# Patient Record
Sex: FEMALE | Race: OTHER
[De-identification: names, ages, dates, MRNs, and addresses within clinical notes are randomized per-mention and may not be internally consistent; named-entity substitution may affect disease eponyms.]

---

## 2024-11-04 ENCOUNTER — HOSPITAL ENCOUNTER (OUTPATIENT)
Facility: HOSPITAL | Age: 24
Setting detail: SPECIMEN
Discharge: HOME | End: 2024-11-07

## 2024-11-04 ENCOUNTER — OFFICE VISIT (OUTPATIENT)
Age: 24
End: 2024-11-04

## 2024-11-04 VITALS
BODY MASS INDEX: 30.36 KG/M2 | OXYGEN SATURATION: 98 % | SYSTOLIC BLOOD PRESSURE: 118 MMHG | HEIGHT: 62 IN | WEIGHT: 165 LBS | HEART RATE: 58 BPM | DIASTOLIC BLOOD PRESSURE: 75 MMHG | TEMPERATURE: 98 F

## 2024-11-04 DIAGNOSIS — N92.6 IRREGULAR MENSTRUATION: ICD-10-CM

## 2024-11-04 DIAGNOSIS — R55 PRE-SYNCOPE: ICD-10-CM

## 2024-11-04 DIAGNOSIS — Z87.42 HISTORY OF OVARIAN CYST: ICD-10-CM

## 2024-11-04 DIAGNOSIS — G58.8 INTERCOSTAL NEURITIS: ICD-10-CM

## 2024-11-04 DIAGNOSIS — R55 PRE-SYNCOPE: Primary | ICD-10-CM

## 2024-11-04 LAB
ALBUMIN SERPL-MCNC: 4.3 G/DL (ref 3.5–5)
ALBUMIN/GLOB SERPL: 1.2 (ref 1.1–2.2)
ALP SERPL-CCNC: 116 U/L (ref 45–117)
ALT SERPL-CCNC: 32 U/L (ref 12–78)
ANION GAP SERPL CALC-SCNC: 7 MMOL/L (ref 2–12)
AST SERPL-CCNC: 16 U/L (ref 15–37)
BASOPHILS # BLD: 0.1 K/UL (ref 0–0.1)
BASOPHILS NFR BLD: 1 % (ref 0–1)
BILIRUB SERPL-MCNC: 0.5 MG/DL (ref 0.2–1)
BUN SERPL-MCNC: 11 MG/DL (ref 6–20)
BUN/CREAT SERPL: 21 (ref 12–20)
CALCIUM SERPL-MCNC: 8.7 MG/DL (ref 8.5–10.1)
CHLORIDE SERPL-SCNC: 108 MMOL/L (ref 97–108)
CHOLEST SERPL-MCNC: 184 MG/DL
CO2 SERPL-SCNC: 25 MMOL/L (ref 21–32)
COMMENT:: NORMAL
CREAT SERPL-MCNC: 0.52 MG/DL (ref 0.55–1.02)
DIFFERENTIAL METHOD BLD: NORMAL
EOSINOPHIL # BLD: 0.1 K/UL (ref 0–0.4)
EOSINOPHIL NFR BLD: 1 % (ref 0–7)
ERYTHROCYTE [DISTWIDTH] IN BLOOD BY AUTOMATED COUNT: 12.1 % (ref 11.5–14.5)
EST. AVERAGE GLUCOSE BLD GHB EST-MCNC: 85 MG/DL
FERRITIN SERPL-MCNC: 128 NG/ML (ref 8–252)
GLOBULIN SER CALC-MCNC: 3.5 G/DL (ref 2–4)
GLUCOSE SERPL-MCNC: 85 MG/DL (ref 65–100)
GLUCOSE, POC: NORMAL MG/DL
HBA1C MFR BLD: 4.6 % (ref 4–5.6)
HCT VFR BLD AUTO: 37.9 % (ref 35–47)
HDLC SERPL-MCNC: 45 MG/DL
HDLC SERPL: 4.1 (ref 0–5)
HGB BLD-MCNC: 12.4 G/DL (ref 11.5–16)
IMM GRANULOCYTES # BLD AUTO: 0 K/UL (ref 0–0.04)
IMM GRANULOCYTES NFR BLD AUTO: 0 % (ref 0–0.5)
LDLC SERPL CALC-MCNC: 121.8 MG/DL (ref 0–100)
LYMPHOCYTES # BLD: 3.3 K/UL (ref 0.8–3.5)
LYMPHOCYTES NFR BLD: 38 % (ref 12–49)
MCH RBC QN AUTO: 30.2 PG (ref 26–34)
MCHC RBC AUTO-ENTMCNC: 32.7 G/DL (ref 30–36.5)
MCV RBC AUTO: 92.2 FL (ref 80–99)
MONOCYTES # BLD: 0.5 K/UL (ref 0–1)
MONOCYTES NFR BLD: 6 % (ref 5–13)
NEUTS SEG # BLD: 4.7 K/UL (ref 1.8–8)
NEUTS SEG NFR BLD: 54 % (ref 32–75)
NRBC # BLD: 0 K/UL (ref 0–0.01)
NRBC BLD-RTO: 0 PER 100 WBC
PLATELET # BLD AUTO: 395 K/UL (ref 150–400)
PMV BLD AUTO: 9.8 FL (ref 8.9–12.9)
POTASSIUM SERPL-SCNC: 4 MMOL/L (ref 3.5–5.1)
PROT SERPL-MCNC: 7.8 G/DL (ref 6.4–8.2)
RBC # BLD AUTO: 4.11 M/UL (ref 3.8–5.2)
SODIUM SERPL-SCNC: 140 MMOL/L (ref 136–145)
SPECIMEN HOLD: NORMAL
TRIGL SERPL-MCNC: 86 MG/DL
TSH SERPL DL<=0.05 MIU/L-ACNC: 1.16 UIU/ML (ref 0.36–3.74)
VIT B12 SERPL-MCNC: 481 PG/ML (ref 193–986)
VLDLC SERPL CALC-MCNC: 17.2 MG/DL
WBC # BLD AUTO: 8.6 K/UL (ref 3.6–11)

## 2024-11-04 PROCEDURE — 82607 VITAMIN B-12: CPT

## 2024-11-04 PROCEDURE — 82306 VITAMIN D 25 HYDROXY: CPT

## 2024-11-04 PROCEDURE — 80061 LIPID PANEL: CPT

## 2024-11-04 PROCEDURE — 85025 COMPLETE CBC W/AUTO DIFF WBC: CPT

## 2024-11-04 PROCEDURE — 80053 COMPREHEN METABOLIC PANEL: CPT

## 2024-11-04 PROCEDURE — 84443 ASSAY THYROID STIM HORMONE: CPT

## 2024-11-04 PROCEDURE — 83036 HEMOGLOBIN GLYCOSYLATED A1C: CPT

## 2024-11-04 PROCEDURE — 84403 ASSAY OF TOTAL TESTOSTERONE: CPT

## 2024-11-04 PROCEDURE — 82728 ASSAY OF FERRITIN: CPT

## 2024-11-04 SDOH — ECONOMIC STABILITY: FOOD INSECURITY: WITHIN THE PAST 12 MONTHS, THE FOOD YOU BOUGHT JUST DIDN'T LAST AND YOU DIDN'T HAVE MONEY TO GET MORE.: SOMETIMES TRUE

## 2024-11-04 SDOH — ECONOMIC STABILITY: INCOME INSECURITY: HOW HARD IS IT FOR YOU TO PAY FOR THE VERY BASICS LIKE FOOD, HOUSING, MEDICAL CARE, AND HEATING?: NOT VERY HARD

## 2024-11-04 SDOH — ECONOMIC STABILITY: FOOD INSECURITY: WITHIN THE PAST 12 MONTHS, YOU WORRIED THAT YOUR FOOD WOULD RUN OUT BEFORE YOU GOT MONEY TO BUY MORE.: SOMETIMES TRUE

## 2024-11-04 ASSESSMENT — ENCOUNTER SYMPTOMS
COUGH: 0
CONSTIPATION: 0
SHORTNESS OF BREATH: 0
NAUSEA: 0
DIARRHEA: 0
ABDOMINAL PAIN: 0

## 2024-11-04 ASSESSMENT — PATIENT HEALTH QUESTIONNAIRE - PHQ9
SUM OF ALL RESPONSES TO PHQ QUESTIONS 1-9: 0
SUM OF ALL RESPONSES TO PHQ QUESTIONS 1-9: 0
SUM OF ALL RESPONSES TO PHQ9 QUESTIONS 1 & 2: 0
1. LITTLE INTEREST OR PLEASURE IN DOING THINGS: NOT AT ALL
SUM OF ALL RESPONSES TO PHQ QUESTIONS 1-9: 0
2. FEELING DOWN, DEPRESSED OR HOPELESS: NOT AT ALL
SUM OF ALL RESPONSES TO PHQ QUESTIONS 1-9: 0

## 2024-11-04 NOTE — PROGRESS NOTES
Coordination of Care  1. Have you been to the ER, urgent care clinic since your last visit?  Hospitalized since your last visit? no    2. Have you seen or consulted any other health care providers outside of the [facility] since your last visit?  Include any pap smears or colon screening. no    Does the patient need refills? no    Learning Assessment Complete? yes  Depression Screening complete in the past 12 months? yes  Results for orders placed or performed in visit on 11/04/24   AMB POC GLUCOSE BLOOD, BY GLUCOSE MONITORING DEVICE   Result Value Ref Range    Glucose, POC 91 f MG/DL       
Dandre Viveros is a 24 y.o. female   Chief Complaint   Patient presents with    Fatigue     Swering,cold hands  happen one time     ASSESSMENT AND PLAN:  1. Pre-syncope  Isolated incident. Normal vital signs and physical exam today.  Check labs.  Encouraged hydration, healthy diet.  - AMB POC GLUCOSE BLOOD, BY GLUCOSE MONITORING DEVICE  - Comprehensive Metabolic Panel; Future  - CBC with Auto Differential; Future  - Hemoglobin A1C; Future  - Lipid Panel; Future  - TSH; Future  - Vitamin B12; Future  - Vitamin D 25 Hydroxy; Future  - Ferritin; Future    3. Intercostal neuritis  CTM. Consider preventive med.    4. Irregular menstruation  - Hemoglobin A1C; Future  - TSH; Future  - Testosterone, Woman and Children; Future  - US NON OB TRANSVAGINAL; Future  - US PELVIS COMPLETE; Future    5. History of ovarian cyst  - US NON OB TRANSVAGINAL; Future  - US PELVIS COMPLETE; Future    SUBJECTIVE:    HPI:  Dandre Viveros is a 24 y.o. female who presents after a presyncopal episode.  While at work last Wednesday, she started feeling dizzy and her vision got blurry. She felt weak. She started feeling cold and her hands cramped and got tingly.  She was given juice and she started feeling a little better but her hands were still stuck in a cramp and tingling. After eating soup, she felt better.  This has happened once 3 years ago and once again last year.  Prior to work she had eaten an apple and a banana.  This was before lunch.    She had a left ovarian cyst a while ago and she has irregular periods.  Currently menstruating.  Sometimes it'll be 3 months between periods, sometimes 2 weeks. Sometimes painful.  Menarche at 13yo.  G0. Not on birth control.  She was prescribed OCPs when she was diagnosed with the ovarian cyst.  She has used a monthly injection in the past, but she had headaches  (last used 1.5 years ago)    She has a left intercostal neuritis for about 8 years.  It is intermittent. The pain comes 
Dandre Viveros seen at discharge. Full name and  verified; After visit Summary was given and reviewed with patient. RN advised patient when provider recommends to return for follow-up visit  for Pap smear  . RN reviewed the provider's instructions with the patient. Patient verbalized her understanding and denies having any further questions at this time.   Due to language barrier, an  ID: [id number]  assisted during this encounter. Patient was informed that these services are NOT free. Care Card process was explained to patient, including that she might receive a bill. Patient was informed that once bill is received that she is to set up an appt with our O.W to start on the financial assistance application that is based on the patient's income. Patient was assisted in scheduling her imaging appointment for her Ultrasound: Pelvis ordered today.  Patient escorted to lab waiting area to have labs completed today.   Ekaterina Suarez RN   
abdominal pain

## 2024-11-05 LAB — 25(OH)D3 SERPL-MCNC: 16.3 NG/ML (ref 30–100)

## 2024-11-07 LAB — TESTOST SERPL-MCNC: 18.8 NG/DL (ref 10–55)

## 2024-11-08 DIAGNOSIS — E55.9 VITAMIN D DEFICIENCY: Primary | ICD-10-CM

## 2024-11-08 RX ORDER — CHOLECALCIFEROL (VITAMIN D3) 1250 MCG
1 CAPSULE ORAL WEEKLY
Qty: 12 CAPSULE | Refills: 1 | Status: SHIPPED | OUTPATIENT
Start: 2024-11-08

## 2024-11-08 NOTE — RESULT ENCOUNTER NOTE
Vit D deficiency - 16.  Start Vit D3 weekly  Mild LDL elevation - 120. Dietary mod.    Normal CBC, CMP, A1C, TSH, B12, Ferritin,and testosterone.    Message sent to patient via "[employer]".

## 2024-11-11 ENCOUNTER — NURSE ONLY (OUTPATIENT)
Age: 24
End: 2024-11-11

## 2024-11-11 NOTE — PROGRESS NOTES
Received pt's request to cancel scheduled ultrasounds. Central Scheduling was contacted and both appointments were cancelled. Nicolette Pereira RN